# Patient Record
Sex: FEMALE | Race: WHITE | NOT HISPANIC OR LATINO | Employment: FULL TIME | URBAN - METROPOLITAN AREA
[De-identification: names, ages, dates, MRNs, and addresses within clinical notes are randomized per-mention and may not be internally consistent; named-entity substitution may affect disease eponyms.]

---

## 2023-09-13 ENCOUNTER — OFFICE VISIT (OUTPATIENT)
Dept: PRIMARY CARE | Facility: CLINIC | Age: 38
End: 2023-09-13
Payer: COMMERCIAL

## 2023-09-13 VITALS
TEMPERATURE: 97.8 F | RESPIRATION RATE: 16 BRPM | WEIGHT: 168 LBS | HEIGHT: 65 IN | DIASTOLIC BLOOD PRESSURE: 74 MMHG | OXYGEN SATURATION: 98 % | HEART RATE: 76 BPM | BODY MASS INDEX: 27.99 KG/M2 | SYSTOLIC BLOOD PRESSURE: 109 MMHG

## 2023-09-13 DIAGNOSIS — Z13.29 SCREENING FOR THYROID DISORDER: ICD-10-CM

## 2023-09-13 DIAGNOSIS — Z23 NEED FOR TETANUS BOOSTER: ICD-10-CM

## 2023-09-13 DIAGNOSIS — Z13.89 SCREENING FOR NEPHROPATHY: ICD-10-CM

## 2023-09-13 DIAGNOSIS — E55.9 VITAMIN D DEFICIENCY: Primary | ICD-10-CM

## 2023-09-13 DIAGNOSIS — Z13.220 SCREENING FOR HYPERLIPIDEMIA: ICD-10-CM

## 2023-09-13 DIAGNOSIS — R00.2 PALPITATIONS: ICD-10-CM

## 2023-09-13 DIAGNOSIS — R25.2 LEG CRAMPS: ICD-10-CM

## 2023-09-13 DIAGNOSIS — Z00.00 HEALTHCARE MAINTENANCE: ICD-10-CM

## 2023-09-13 DIAGNOSIS — Z13.0 SCREENING FOR DEFICIENCY ANEMIA: ICD-10-CM

## 2023-09-13 PROBLEM — N63.11 BREAST LUMP ON RIGHT SIDE AT 11 O'CLOCK POSITION: Status: ACTIVE | Noted: 2023-09-13

## 2023-09-13 PROBLEM — N60.01 CYST OF RIGHT BREAST: Status: ACTIVE | Noted: 2023-09-13

## 2023-09-13 PROBLEM — R39.15 URGENCY OF URINATION: Status: ACTIVE | Noted: 2023-09-13

## 2023-09-13 PROBLEM — R35.0 URINARY FREQUENCY: Status: ACTIVE | Noted: 2023-09-13

## 2023-09-13 PROBLEM — N94.3 PMS (PREMENSTRUAL SYNDROME): Status: ACTIVE | Noted: 2023-09-13

## 2023-09-13 PROCEDURE — 1036F TOBACCO NON-USER: CPT

## 2023-09-13 PROCEDURE — 90471 IMMUNIZATION ADMIN: CPT

## 2023-09-13 PROCEDURE — 99395 PREV VISIT EST AGE 18-39: CPT

## 2023-09-13 PROCEDURE — 90715 TDAP VACCINE 7 YRS/> IM: CPT

## 2023-09-13 RX ORDER — GINSENG 100 MG
1 CAPSULE ORAL DAILY
COMMUNITY

## 2023-09-13 RX ORDER — MULTIVIT-MIN/IRON/FOLIC ACID/K 45-800-120
1 CAPSULE ORAL DAILY
COMMUNITY

## 2023-09-13 RX ORDER — ACETAMINOPHEN 500 MG
1 TABLET ORAL DAILY
COMMUNITY

## 2023-09-13 RX ORDER — CALCIUM CARB, CITRATE, MALATE 250 MG
1 CAPSULE ORAL EVERY OTHER DAY
COMMUNITY

## 2023-09-13 SDOH — ECONOMIC STABILITY: FOOD INSECURITY: WITHIN THE PAST 12 MONTHS, THE FOOD YOU BOUGHT JUST DIDN'T LAST AND YOU DIDN'T HAVE MONEY TO GET MORE.: NEVER TRUE

## 2023-09-13 SDOH — ECONOMIC STABILITY: FOOD INSECURITY: WITHIN THE PAST 12 MONTHS, YOU WORRIED THAT YOUR FOOD WOULD RUN OUT BEFORE YOU GOT MONEY TO BUY MORE.: NEVER TRUE

## 2023-09-13 ASSESSMENT — LIFESTYLE VARIABLES
SKIP TO QUESTIONS 9-10: 1
AUDIT-C TOTAL SCORE: 1
HOW MANY STANDARD DRINKS CONTAINING ALCOHOL DO YOU HAVE ON A TYPICAL DAY: 1 OR 2
HOW OFTEN DO YOU HAVE SIX OR MORE DRINKS ON ONE OCCASION: NEVER
HOW OFTEN DO YOU HAVE A DRINK CONTAINING ALCOHOL: MONTHLY OR LESS

## 2023-09-13 ASSESSMENT — ENCOUNTER SYMPTOMS
RESPIRATORY NEGATIVE: 1
PSYCHIATRIC NEGATIVE: 1
ENDOCRINE NEGATIVE: 1
NEUROLOGICAL NEGATIVE: 1
CARDIOVASCULAR NEGATIVE: 1
CONSTITUTIONAL NEGATIVE: 1
MUSCULOSKELETAL NEGATIVE: 1
EYES NEGATIVE: 1
HEMATOLOGIC/LYMPHATIC NEGATIVE: 1
GASTROINTESTINAL NEGATIVE: 1

## 2023-09-13 ASSESSMENT — PATIENT HEALTH QUESTIONNAIRE - PHQ9
2. FEELING DOWN, DEPRESSED OR HOPELESS: NOT AT ALL
1. LITTLE INTEREST OR PLEASURE IN DOING THINGS: NOT AT ALL
SUM OF ALL RESPONSES TO PHQ9 QUESTIONS 1 & 2: 0

## 2023-09-13 NOTE — PROGRESS NOTES
Subjective   Patient ID: Marcella Baird is a 38 y.o. female who presents for CPE and visit to establish care.    Diet: Mostly cooking at home, good amount of fruits, veggies, lean proteins. Occasional soda, no excess sugary foods  Exercise: 5-7 days a week, was doing mixture of cardio and strength training, hurt her calf and has been doing less cardio and more pilates and HIIT   Weight: Down 20 lbs since January 2023, gained 80lbs with birth of first son 18 years ago  Water: Drinking about 54oz per day, 2-3 cups coffee per day  Sleep: Good sleep, getting 7-9 hours per night   Social:  and remarried, 2 children 2 stepchildren. Lives in a 3 floor home, no issues with stairs. 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     Review of Systems   Constitutional: Negative.    HENT: Negative.     Eyes: Negative.    Respiratory: Negative.     Cardiovascular: Negative.    Gastrointestinal: Negative.    Endocrine: Negative.    Genitourinary: Negative.    Musculoskeletal: Negative.    Skin: Negative.    Neurological: Negative.    Hematological: Negative.    Psychiatric/Behavioral: Negative.          Current Outpatient Medications   Medication Sig Dispense Refill    cholecalciferol (Vitamin D-3) 50 mcg (2,000 unit) capsule Take 1 capsule (50 mcg) by mouth once daily.      cranberry extract 200 mg capsule Take 1 capsule by mouth once daily.      multivitamin-min-iron-FA-vit K (Bariatric Multivitamins) 45 mg iron- 800 mcg-120 mcg capsule Take 1 capsule by mouth once daily.      potassium citrate 99 mg capsule Take 1 tablet by mouth every other day.       No current facility-administered medications for this visit.     Past Surgical History:   Procedure Laterality Date    OTHER SURGICAL HISTORY  08/17/2020    No history of surgery     Family History   Problem Relation Name Age of Onset    No Known Problems Mother      Other (cardiac disorder) Father    "   Coronary artery disease Father      Heart failure Father      Breast cancer Maternal Grandmother      Other (DM2) Maternal Grandmother      Coronary artery disease Paternal Grandfather        Social History     Tobacco Use    Smoking status: Former     Types: Cigarettes    Smokeless tobacco: Never   Vaping Use    Vaping Use: Every day    Substances: Nicotine, Flavoring   Substance Use Topics    Alcohol use: Not Currently     Comment: occasionally    Drug use: Never        Objective     Visit Vitals  /74 (BP Location: Left arm, Patient Position: Sitting, BP Cuff Size: Adult)   Pulse 76   Temp 36.6 °C (97.8 °F)   Resp 16   Ht 1.645 m (5' 4.75\")   Wt 76.2 kg (168 lb)   SpO2 98%   BMI 28.17 kg/m²   Smoking Status Former   BSA 1.87 m²        Physical Exam  Constitutional:       Appearance: Normal appearance.   HENT:      Head: Normocephalic and atraumatic.   Eyes:      Extraocular Movements: Extraocular movements intact.      Pupils: Pupils are equal, round, and reactive to light.   Cardiovascular:      Rate and Rhythm: Normal rate and regular rhythm.   Pulmonary:      Effort: Pulmonary effort is normal.      Breath sounds: Normal breath sounds.   Abdominal:      General: Abdomen is flat. Bowel sounds are normal.      Palpations: Abdomen is soft.   Musculoskeletal:         General: Normal range of motion.   Skin:     General: Skin is warm and dry.      Capillary Refill: Capillary refill takes less than 2 seconds.   Neurological:      General: No focal deficit present.      Mental Status: She is alert and oriented to person, place, and time.   Psychiatric:         Mood and Affect: Mood normal.         Behavior: Behavior normal.           Assessment/Plan   Problem List Items Addressed This Visit       Palpitations     Check potassium, magnesium for electrolyte disturbances.          Vitamin D deficiency - Primary    Relevant Orders    Vitamin D 25-Hydroxy,Total (for eval of Vitamin D levels)    Healthcare " maintenance     - Healthy diet, good quality and duration of sleep, healthy water intake, regular exercise and healthy weight discussed  Vaccines   Flu: Recommended annually  Tdap: About 8 years ago  - GYN/PAP: Following routinely  -Following with dentistry regularly  -No symptoms of depression/concerns for anxiety  -Feeling safe at home  -Skin cancer prevention           Other Visit Diagnoses       Screening for deficiency anemia        Relevant Orders    CBC    Screening for nephropathy        Relevant Orders    Comprehensive Metabolic Panel    Screening for hyperlipidemia        Relevant Orders    Lipid Panel    Leg cramps        Relevant Orders    Magnesium    Screening for thyroid disorder        Relevant Orders    TSH with reflex to Free T4 if abnormal    Need for tetanus booster        Relevant Orders    Tdap vaccine, age 10 years and older  (BOOSTRIX) (Completed)            All pertinent lab work and results were reviewed with patient.     Follow up with me in 1 year or sooner as needed    Vandana Whittington, APRN-CNS

## 2023-09-13 NOTE — PATIENT INSTRUCTIONS
Thank you for coming to see me today.  If you have any questions or concerns following our visit, please contact the office.  Phone: [phone]    Follow up with me in 1 year or sooner as needed    1)  Get fasting labwork in the next 1-2 weeks.  The lab is down the huang from our office.

## 2023-09-27 ENCOUNTER — LAB (OUTPATIENT)
Dept: LAB | Facility: LAB | Age: 38
End: 2023-09-27
Payer: COMMERCIAL

## 2023-09-27 DIAGNOSIS — Z13.29 SCREENING FOR THYROID DISORDER: ICD-10-CM

## 2023-09-27 DIAGNOSIS — R25.2 LEG CRAMPS: ICD-10-CM

## 2023-09-27 DIAGNOSIS — Z13.220 SCREENING FOR HYPERLIPIDEMIA: ICD-10-CM

## 2023-09-27 DIAGNOSIS — Z13.0 SCREENING FOR DEFICIENCY ANEMIA: ICD-10-CM

## 2023-09-27 DIAGNOSIS — E55.9 VITAMIN D DEFICIENCY: ICD-10-CM

## 2023-09-27 DIAGNOSIS — Z13.89 SCREENING FOR NEPHROPATHY: ICD-10-CM

## 2023-09-27 LAB
ALANINE AMINOTRANSFERASE (SGPT) (U/L) IN SER/PLAS: 7 U/L (ref 7–45)
ALBUMIN (G/DL) IN SER/PLAS: 4.6 G/DL (ref 3.4–5)
ALKALINE PHOSPHATASE (U/L) IN SER/PLAS: 38 U/L (ref 33–110)
ANION GAP IN SER/PLAS: 13 MMOL/L (ref 10–20)
ASPARTATE AMINOTRANSFERASE (SGOT) (U/L) IN SER/PLAS: 14 U/L (ref 9–39)
BILIRUBIN TOTAL (MG/DL) IN SER/PLAS: 0.5 MG/DL (ref 0–1.2)
CALCIDIOL (25 OH VITAMIN D3) (NG/ML) IN SER/PLAS: 41 NG/ML
CALCIUM (MG/DL) IN SER/PLAS: 9.4 MG/DL (ref 8.6–10.3)
CARBON DIOXIDE, TOTAL (MMOL/L) IN SER/PLAS: 25 MMOL/L (ref 21–32)
CHLORIDE (MMOL/L) IN SER/PLAS: 103 MMOL/L (ref 98–107)
CHOLESTEROL (MG/DL) IN SER/PLAS: 220 MG/DL (ref 0–199)
CHOLESTEROL IN HDL (MG/DL) IN SER/PLAS: 66.5 MG/DL
CHOLESTEROL/HDL RATIO: 3.3
CREATININE (MG/DL) IN SER/PLAS: 0.81 MG/DL (ref 0.5–1.05)
ERYTHROCYTE DISTRIBUTION WIDTH (RATIO) BY AUTOMATED COUNT: 12.1 % (ref 11.5–14.5)
ERYTHROCYTE MEAN CORPUSCULAR HEMOGLOBIN CONCENTRATION (G/DL) BY AUTOMATED: 33.3 G/DL (ref 32–36)
ERYTHROCYTE MEAN CORPUSCULAR VOLUME (FL) BY AUTOMATED COUNT: 94 FL (ref 80–100)
ERYTHROCYTES (10*6/UL) IN BLOOD BY AUTOMATED COUNT: 4.32 X10E12/L (ref 4–5.2)
GFR FEMALE: >90 ML/MIN/1.73M2
GLUCOSE (MG/DL) IN SER/PLAS: 104 MG/DL (ref 74–99)
HEMATOCRIT (%) IN BLOOD BY AUTOMATED COUNT: 40.6 % (ref 36–46)
HEMOGLOBIN (G/DL) IN BLOOD: 13.5 G/DL (ref 12–16)
LDL: 126 MG/DL (ref 0–99)
LEUKOCYTES (10*3/UL) IN BLOOD BY AUTOMATED COUNT: 7.9 X10E9/L (ref 4.4–11.3)
MAGNESIUM (MG/DL) IN SER/PLAS: 1.96 MG/DL (ref 1.6–2.4)
PLATELETS (10*3/UL) IN BLOOD AUTOMATED COUNT: 267 X10E9/L (ref 150–450)
POTASSIUM (MMOL/L) IN SER/PLAS: 4.5 MMOL/L (ref 3.5–5.3)
PROTEIN TOTAL: 6.7 G/DL (ref 6.4–8.2)
SODIUM (MMOL/L) IN SER/PLAS: 136 MMOL/L (ref 136–145)
THYROTROPIN (MIU/L) IN SER/PLAS BY DETECTION LIMIT <= 0.05 MIU/L: 2.19 MIU/L (ref 0.44–3.98)
TRIGLYCERIDE (MG/DL) IN SER/PLAS: 136 MG/DL (ref 0–149)
UREA NITROGEN (MG/DL) IN SER/PLAS: 11 MG/DL (ref 6–23)
VLDL: 27 MG/DL (ref 0–40)

## 2023-09-27 PROCEDURE — 36415 COLL VENOUS BLD VENIPUNCTURE: CPT

## 2023-09-27 PROCEDURE — 84443 ASSAY THYROID STIM HORMONE: CPT

## 2023-09-27 PROCEDURE — 82306 VITAMIN D 25 HYDROXY: CPT

## 2023-09-27 PROCEDURE — 83735 ASSAY OF MAGNESIUM: CPT

## 2023-09-27 PROCEDURE — 80061 LIPID PANEL: CPT

## 2023-09-27 PROCEDURE — 80053 COMPREHEN METABOLIC PANEL: CPT

## 2023-09-27 PROCEDURE — 85027 COMPLETE CBC AUTOMATED: CPT

## 2024-09-16 ENCOUNTER — APPOINTMENT (OUTPATIENT)
Dept: PRIMARY CARE | Facility: CLINIC | Age: 39
End: 2024-09-16
Payer: COMMERCIAL

## 2024-09-18 ENCOUNTER — APPOINTMENT (OUTPATIENT)
Dept: OBSTETRICS AND GYNECOLOGY | Facility: CLINIC | Age: 39
End: 2024-09-18
Payer: COMMERCIAL

## 2024-09-18 ENCOUNTER — APPOINTMENT (OUTPATIENT)
Dept: PRIMARY CARE | Facility: CLINIC | Age: 39
End: 2024-09-18
Payer: COMMERCIAL

## 2024-09-18 VITALS
SYSTOLIC BLOOD PRESSURE: 100 MMHG | HEIGHT: 64 IN | WEIGHT: 149.4 LBS | DIASTOLIC BLOOD PRESSURE: 68 MMHG | BODY MASS INDEX: 25.51 KG/M2

## 2024-09-18 VITALS
WEIGHT: 148 LBS | OXYGEN SATURATION: 98 % | SYSTOLIC BLOOD PRESSURE: 97 MMHG | DIASTOLIC BLOOD PRESSURE: 66 MMHG | HEART RATE: 74 BPM | TEMPERATURE: 96.9 F | RESPIRATION RATE: 16 BRPM | BODY MASS INDEX: 25.27 KG/M2 | HEIGHT: 64 IN

## 2024-09-18 DIAGNOSIS — Z13.0 SCREENING FOR DEFICIENCY ANEMIA: Primary | ICD-10-CM

## 2024-09-18 DIAGNOSIS — N39.41 URGE INCONTINENCE: Primary | ICD-10-CM

## 2024-09-18 DIAGNOSIS — Z01.419 WELL WOMAN EXAM: ICD-10-CM

## 2024-09-18 DIAGNOSIS — E78.2 MIXED HYPERLIPIDEMIA: ICD-10-CM

## 2024-09-18 DIAGNOSIS — Z12.31 ENCOUNTER FOR SCREENING MAMMOGRAM FOR MALIGNANT NEOPLASM OF BREAST: ICD-10-CM

## 2024-09-18 DIAGNOSIS — R39.15 URGENCY OF URINATION: ICD-10-CM

## 2024-09-18 DIAGNOSIS — Z13.29 SCREENING FOR THYROID DISORDER: ICD-10-CM

## 2024-09-18 DIAGNOSIS — Z13.1 DIABETES MELLITUS SCREENING: ICD-10-CM

## 2024-09-18 DIAGNOSIS — R73.9 HYPERGLYCEMIA: ICD-10-CM

## 2024-09-18 PROBLEM — R35.0 URINARY FREQUENCY: Status: RESOLVED | Noted: 2023-09-13 | Resolved: 2024-09-18

## 2024-09-18 LAB
POC APPEARANCE, URINE: CLEAR
POC BILIRUBIN, URINE: NEGATIVE
POC BLOOD, URINE: NEGATIVE
POC COLOR, URINE: COLORLESS
POC GLUCOSE, URINE: NEGATIVE MG/DL
POC KETONES, URINE: NEGATIVE MG/DL
POC LEUKOCYTES, URINE: NEGATIVE
POC NITRITE,URINE: NEGATIVE
POC PH, URINE: 6 PH
POC PROTEIN, URINE: NEGATIVE MG/DL
POC SPECIFIC GRAVITY, URINE: <=1.005
POC UROBILINOGEN, URINE: 0.2 EU/DL

## 2024-09-18 PROCEDURE — 3008F BODY MASS INDEX DOCD: CPT | Performed by: STUDENT IN AN ORGANIZED HEALTH CARE EDUCATION/TRAINING PROGRAM

## 2024-09-18 PROCEDURE — 1036F TOBACCO NON-USER: CPT

## 2024-09-18 PROCEDURE — 81002 URINALYSIS NONAUTO W/O SCOPE: CPT | Performed by: STUDENT IN AN ORGANIZED HEALTH CARE EDUCATION/TRAINING PROGRAM

## 2024-09-18 PROCEDURE — 99213 OFFICE O/P EST LOW 20 MIN: CPT | Performed by: STUDENT IN AN ORGANIZED HEALTH CARE EDUCATION/TRAINING PROGRAM

## 2024-09-18 PROCEDURE — 3008F BODY MASS INDEX DOCD: CPT

## 2024-09-18 RX ORDER — MIRABEGRON 25 MG/1
25 TABLET, FILM COATED, EXTENDED RELEASE ORAL DAILY
Qty: 30 TABLET | Refills: 11 | Status: SHIPPED | OUTPATIENT
Start: 2024-09-18

## 2024-09-18 RX ORDER — MULTIVIT-MIN/IRON FUM/FOLIC AC 7.5 MG-4
1 TABLET ORAL DAILY
COMMUNITY

## 2024-09-18 SDOH — ECONOMIC STABILITY: FOOD INSECURITY: WITHIN THE PAST 12 MONTHS, YOU WORRIED THAT YOUR FOOD WOULD RUN OUT BEFORE YOU GOT MONEY TO BUY MORE.: NEVER TRUE

## 2024-09-18 SDOH — ECONOMIC STABILITY: FOOD INSECURITY: WITHIN THE PAST 12 MONTHS, THE FOOD YOU BOUGHT JUST DIDN'T LAST AND YOU DIDN'T HAVE MONEY TO GET MORE.: NEVER TRUE

## 2024-09-18 ASSESSMENT — COLUMBIA-SUICIDE SEVERITY RATING SCALE - C-SSRS
1. IN THE PAST MONTH, HAVE YOU WISHED YOU WERE DEAD OR WISHED YOU COULD GO TO SLEEP AND NOT WAKE UP?: NO
2. HAVE YOU ACTUALLY HAD ANY THOUGHTS OF KILLING YOURSELF?: NO
6. HAVE YOU EVER DONE ANYTHING, STARTED TO DO ANYTHING, OR PREPARED TO DO ANYTHING TO END YOUR LIFE?: NO

## 2024-09-18 ASSESSMENT — LIFESTYLE VARIABLES
HOW OFTEN DO YOU HAVE SIX OR MORE DRINKS ON ONE OCCASION: NEVER
HOW OFTEN DO YOU HAVE A DRINK CONTAINING ALCOHOL: MONTHLY OR LESS
AUDIT-C TOTAL SCORE: 1
SKIP TO QUESTIONS 9-10: 1
HOW MANY STANDARD DRINKS CONTAINING ALCOHOL DO YOU HAVE ON A TYPICAL DAY: 1 OR 2

## 2024-09-18 ASSESSMENT — ENCOUNTER SYMPTOMS
DEPRESSION: 0
OCCASIONAL FEELINGS OF UNSTEADINESS: 0
LOSS OF SENSATION IN FEET: 0

## 2024-09-18 ASSESSMENT — PATIENT HEALTH QUESTIONNAIRE - PHQ9
SUM OF ALL RESPONSES TO PHQ9 QUESTIONS 1 AND 2: 0
2. FEELING DOWN, DEPRESSED OR HOPELESS: NOT AT ALL
2. FEELING DOWN, DEPRESSED OR HOPELESS: NOT AT ALL
1. LITTLE INTEREST OR PLEASURE IN DOING THINGS: NOT AT ALL
SUM OF ALL RESPONSES TO PHQ9 QUESTIONS 1 AND 2: 0
1. LITTLE INTEREST OR PLEASURE IN DOING THINGS: NOT AT ALL

## 2024-09-18 ASSESSMENT — PAIN SCALES - GENERAL: PAINLEVEL: 0-NO PAIN

## 2024-09-18 NOTE — PROGRESS NOTES
Subjective   Patient ID: Marcella Baird is a 39 y.o. year old female patient presenting for   Chief Complaint   Patient presents with    Annual Exam    est patient well woman     No concerns noted    .  Patient presents for well woman exam.  Sexual History: No pain intercourse.   Current Contraception:  has a vasectomy.  Menstrual History: She has periods, every month. 4-5 days. First two days are heavy. She does not miss work for this. Ibuprofen works well.  Pregnancy History: 2 kids by SVDx2. 17 and 20 years of age.  Occupation: She works at [employer]. She mostly works with the [employer].    She does have increased urge. Especially during the day. She will get 1-3 nocturia. She has done Oxybutynin in the past, but did not tolerate side effects. She did do Mybetriq and it worked, but the medication was too expensive at the time    No family hx of: Uterine, ovarian, cervical, pancreatic, or colon cancer.    Maternal Great grandmother: Breast cancer in 70s.      Patient did used to smoke cigarettes at 12. She quit with each pregnancy. She occasionally smokes cigarettes, not consistent use. She uses maybe one pack every 3-6 months.    No unexplained weight loss. No weight gain. No fatigue. No fevers or chills. No night sweats. No vision changes. No difficulty swallowing. No dyspnea, chest pain, or orthopnea. No breast masses or nipple discharge. No nausea/vomiting. No diarrhea or constipation. No blood in stool. No burning urination or hesitancy. No hematuria. No dyspareunia or vaginal dryness. No vaginal bleeding. No numbness or tingling of the extremities. No hair loss, skin growths, or rashes/bruising.        Review of Systems   All other systems reviewed and are negative.      Past Medical History:   Diagnosis Date    Breast lump on right side at 11 o'clock position 09/13/2023    Cyst of right breast 09/13/2023    Other specified health status     No pertinent past medical history    Palpitations  09/13/2023    PMS (premenstrual syndrome) 09/13/2023    Vitamin D deficiency 09/13/2023       Past Surgical History:   Procedure Laterality Date    OTHER SURGICAL HISTORY  08/17/2020    No history of surgery       Social History     Socioeconomic History    Marital status:      Spouse name: Not on file    Number of children: Not on file    Years of education: Not on file    Highest education level: Not on file   Occupational History    Not on file   Tobacco Use    Smoking status: Former     Types: Cigarettes    Smokeless tobacco: Never   Vaping Use    Vaping status: Every Day    Substances: Nicotine, Flavoring   Substance and Sexual Activity    Alcohol use: Not Currently     Comment: occasionally    Drug use: Never    Sexual activity: Yes     Partners: Male   Other Topics Concern    Not on file   Social History Narrative    Not on file     Social Determinants of Health     Financial Resource Strain: Not on file   Food Insecurity: No Food Insecurity (9/18/2024)    Hunger Vital Sign     Worried About Running Out of Food in the Last Year: Never true     Ran Out of Food in the Last Year: Never true   Transportation Needs: Not on file   Physical Activity: Not on file   Stress: Not on file   Social Connections: Not on file   Intimate Partner Violence: Not on file   Housing Stability: Not on file           Objective   Physical Exam  General: A&Ox3  Head: Normocephalic, atraumatic  Heart/Lungs: RRR, No murmurs, gallops, or rubs. Lungs are CTAB.  Breast: Normal in appearance bilaterally. No skin changes. No rashes or bruises. No palpable masses.  Abdomen: Soft, nontender. BS+4. No bruising or masses.  Genitourinary: Labia and vagina normal in appearance. Uterus is small, mobile, anteverted. No adnexal masses palpated.  Lower Extremities: No lower extremity Edema no palpable cords.     Assessment/Plan   Problem List Items Addressed This Visit       Urgency of urination    Overview     Previously did well on Myrbetriq.  U/a obtained.  Will start Myrbetriq. Will re-evaluate if too costly. She is open to referral with Uro/Gyn and bladder botox if this treatment fails.         Relevant Orders    POCT UA (nonautomated) manually resulted (Completed)    Well woman exam    Overview     HbA1c obtained.  Breast exam wnl. Mammogram ordered.  Up to date on other screening tests and labs with her PCP.  Follow up in 4 weeks for response to Myrbetriq.          Other Visit Diagnoses       Urge incontinence    -  Primary    Relevant Medications    mirabegron (Myrbetriq) 25 mg tablet extended release 24 hr 24 hr tablet    Encounter for screening mammogram for malignant neoplasm of breast        Relevant Orders    BI mammo bilateral screening tomosynthesis    Diabetes mellitus screening        Relevant Orders    Hemoglobin A1C              Steve Epps MD 04/20/24 3:41 PM

## 2024-09-18 NOTE — PROGRESS NOTES
Patient left without being seen due to provider running behind schedule.  Fasting lab orders placed.    Vandana BRUCE-CNS

## 2024-09-27 ENCOUNTER — APPOINTMENT (OUTPATIENT)
Dept: RADIOLOGY | Facility: CLINIC | Age: 39
End: 2024-09-27
Payer: COMMERCIAL

## 2024-10-04 ENCOUNTER — HOSPITAL ENCOUNTER (OUTPATIENT)
Dept: RADIOLOGY | Facility: CLINIC | Age: 39
Discharge: HOME | End: 2024-10-04
Payer: COMMERCIAL

## 2024-10-04 ENCOUNTER — LAB (OUTPATIENT)
Dept: LAB | Facility: LAB | Age: 39
End: 2024-10-04
Payer: COMMERCIAL

## 2024-10-04 VITALS — BODY MASS INDEX: 24.41 KG/M2 | HEIGHT: 64 IN | WEIGHT: 143 LBS

## 2024-10-04 DIAGNOSIS — Z13.29 SCREENING FOR THYROID DISORDER: ICD-10-CM

## 2024-10-04 DIAGNOSIS — Z12.31 ENCOUNTER FOR SCREENING MAMMOGRAM FOR MALIGNANT NEOPLASM OF BREAST: ICD-10-CM

## 2024-10-04 DIAGNOSIS — Z13.1 DIABETES MELLITUS SCREENING: ICD-10-CM

## 2024-10-04 DIAGNOSIS — E78.2 MIXED HYPERLIPIDEMIA: ICD-10-CM

## 2024-10-04 LAB
25(OH)D3 SERPL-MCNC: 54 NG/ML (ref 30–100)
ALBUMIN SERPL BCP-MCNC: 4.4 G/DL (ref 3.4–5)
ALP SERPL-CCNC: 38 U/L (ref 33–110)
ALT SERPL W P-5'-P-CCNC: 7 U/L (ref 7–45)
ANION GAP SERPL CALC-SCNC: 11 MMOL/L (ref 10–20)
AST SERPL W P-5'-P-CCNC: 12 U/L (ref 9–39)
BILIRUB SERPL-MCNC: 0.5 MG/DL (ref 0–1.2)
BUN SERPL-MCNC: 14 MG/DL (ref 6–23)
CALCIUM SERPL-MCNC: 9.2 MG/DL (ref 8.6–10.3)
CHLORIDE SERPL-SCNC: 102 MMOL/L (ref 98–107)
CHOLEST SERPL-MCNC: 180 MG/DL (ref 0–199)
CHOLESTEROL/HDL RATIO: 3
CO2 SERPL-SCNC: 26 MMOL/L (ref 21–32)
CREAT SERPL-MCNC: 0.78 MG/DL (ref 0.5–1.05)
EGFRCR SERPLBLD CKD-EPI 2021: >90 ML/MIN/1.73M*2
ERYTHROCYTE [DISTWIDTH] IN BLOOD BY AUTOMATED COUNT: 12.2 % (ref 11.5–14.5)
EST. AVERAGE GLUCOSE BLD GHB EST-MCNC: 103 MG/DL
GLUCOSE SERPL-MCNC: 87 MG/DL (ref 74–99)
HBA1C MFR BLD: 5.2 %
HCT VFR BLD AUTO: 41.1 % (ref 36–46)
HDLC SERPL-MCNC: 60 MG/DL
HGB BLD-MCNC: 13.5 G/DL (ref 12–16)
LDLC SERPL CALC-MCNC: 103 MG/DL
MCH RBC QN AUTO: 31.1 PG (ref 26–34)
MCHC RBC AUTO-ENTMCNC: 32.8 G/DL (ref 32–36)
MCV RBC AUTO: 95 FL (ref 80–100)
NON HDL CHOLESTEROL: 120 MG/DL (ref 0–149)
NRBC BLD-RTO: 0 /100 WBCS (ref 0–0)
PLATELET # BLD AUTO: 257 X10*3/UL (ref 150–450)
POTASSIUM SERPL-SCNC: 3.8 MMOL/L (ref 3.5–5.3)
PROT SERPL-MCNC: 6.6 G/DL (ref 6.4–8.2)
RBC # BLD AUTO: 4.34 X10*6/UL (ref 4–5.2)
SODIUM SERPL-SCNC: 135 MMOL/L (ref 136–145)
TRIGL SERPL-MCNC: 87 MG/DL (ref 0–149)
TSH SERPL-ACNC: 1.96 MIU/L (ref 0.44–3.98)
VLDL: 17 MG/DL (ref 0–40)
WBC # BLD AUTO: 6.5 X10*3/UL (ref 4.4–11.3)

## 2024-10-04 PROCEDURE — 85027 COMPLETE CBC AUTOMATED: CPT

## 2024-10-04 PROCEDURE — 84443 ASSAY THYROID STIM HORMONE: CPT

## 2024-10-04 PROCEDURE — 36415 COLL VENOUS BLD VENIPUNCTURE: CPT

## 2024-10-04 PROCEDURE — 80053 COMPREHEN METABOLIC PANEL: CPT

## 2024-10-04 PROCEDURE — 77063 BREAST TOMOSYNTHESIS BI: CPT | Performed by: RADIOLOGY

## 2024-10-04 PROCEDURE — 80061 LIPID PANEL: CPT

## 2024-10-04 PROCEDURE — 77067 SCR MAMMO BI INCL CAD: CPT | Performed by: RADIOLOGY

## 2024-10-04 PROCEDURE — 77067 SCR MAMMO BI INCL CAD: CPT

## 2024-10-04 PROCEDURE — 82306 VITAMIN D 25 HYDROXY: CPT

## 2024-10-04 PROCEDURE — 83036 HEMOGLOBIN GLYCOSYLATED A1C: CPT

## 2024-12-02 ENCOUNTER — APPOINTMENT (OUTPATIENT)
Dept: PRIMARY CARE | Facility: CLINIC | Age: 39
End: 2024-12-02
Payer: COMMERCIAL

## 2024-12-02 VITALS
WEIGHT: 138 LBS | SYSTOLIC BLOOD PRESSURE: 102 MMHG | HEART RATE: 85 BPM | DIASTOLIC BLOOD PRESSURE: 78 MMHG | BODY MASS INDEX: 23.56 KG/M2 | RESPIRATION RATE: 16 BRPM | TEMPERATURE: 98.4 F | HEIGHT: 64 IN | OXYGEN SATURATION: 96 %

## 2024-12-02 DIAGNOSIS — Z00.00 HEALTHCARE MAINTENANCE: Primary | ICD-10-CM

## 2024-12-02 PROCEDURE — 1036F TOBACCO NON-USER: CPT

## 2024-12-02 PROCEDURE — 3008F BODY MASS INDEX DOCD: CPT

## 2024-12-02 PROCEDURE — 99396 PREV VISIT EST AGE 40-64: CPT

## 2024-12-02 SDOH — ECONOMIC STABILITY: FOOD INSECURITY: WITHIN THE PAST 12 MONTHS, YOU WORRIED THAT YOUR FOOD WOULD RUN OUT BEFORE YOU GOT MONEY TO BUY MORE.: NEVER TRUE

## 2024-12-02 SDOH — ECONOMIC STABILITY: FOOD INSECURITY: WITHIN THE PAST 12 MONTHS, THE FOOD YOU BOUGHT JUST DIDN'T LAST AND YOU DIDN'T HAVE MONEY TO GET MORE.: NEVER TRUE

## 2024-12-02 ASSESSMENT — ENCOUNTER SYMPTOMS
GASTROINTESTINAL NEGATIVE: 1
PSYCHIATRIC NEGATIVE: 1
CONSTITUTIONAL NEGATIVE: 1
ENDOCRINE NEGATIVE: 1
NEUROLOGICAL NEGATIVE: 1
OCCASIONAL FEELINGS OF UNSTEADINESS: 0
MUSCULOSKELETAL NEGATIVE: 1
DEPRESSION: 0
CARDIOVASCULAR NEGATIVE: 1
HEMATOLOGIC/LYMPHATIC NEGATIVE: 1
LOSS OF SENSATION IN FEET: 0
RESPIRATORY NEGATIVE: 1
EYES NEGATIVE: 1

## 2024-12-02 ASSESSMENT — LIFESTYLE VARIABLES
HOW OFTEN DO YOU HAVE SIX OR MORE DRINKS ON ONE OCCASION: NEVER
HOW MANY STANDARD DRINKS CONTAINING ALCOHOL DO YOU HAVE ON A TYPICAL DAY: 1 OR 2
SKIP TO QUESTIONS 9-10: 1
AUDIT-C TOTAL SCORE: 1
HOW OFTEN DO YOU HAVE A DRINK CONTAINING ALCOHOL: MONTHLY OR LESS

## 2024-12-02 ASSESSMENT — COLUMBIA-SUICIDE SEVERITY RATING SCALE - C-SSRS
2. HAVE YOU ACTUALLY HAD ANY THOUGHTS OF KILLING YOURSELF?: NO
6. HAVE YOU EVER DONE ANYTHING, STARTED TO DO ANYTHING, OR PREPARED TO DO ANYTHING TO END YOUR LIFE?: NO
1. IN THE PAST MONTH, HAVE YOU WISHED YOU WERE DEAD OR WISHED YOU COULD GO TO SLEEP AND NOT WAKE UP?: NO

## 2024-12-02 ASSESSMENT — PATIENT HEALTH QUESTIONNAIRE - PHQ9
2. FEELING DOWN, DEPRESSED OR HOPELESS: NOT AT ALL
1. LITTLE INTEREST OR PLEASURE IN DOING THINGS: NOT AT ALL
1. LITTLE INTEREST OR PLEASURE IN DOING THINGS: NOT AT ALL
SUM OF ALL RESPONSES TO PHQ9 QUESTIONS 1 AND 2: 0
2. FEELING DOWN, DEPRESSED OR HOPELESS: NOT AT ALL
SUM OF ALL RESPONSES TO PHQ9 QUESTIONS 1 & 2: 0

## 2024-12-02 ASSESSMENT — PAIN SCALES - GENERAL: PAINLEVEL_OUTOF10: 0-NO PAIN

## 2024-12-02 NOTE — PROGRESS NOTES
Subjective   Patient ID: Marcella Baird is a 39 y.o. female who presents for CPE.    No issues, problems, concerns for visit today.    Diet: Mostly cooking at home, good amount of fruits, veggies, lean proteins.  Started Weight Watchers in March 2024, down 30 lbs. Occasional soda but will have it as mixed drink, no excess sugary foods  Exercise: 5-7 days a week, was doing mixture of cardio and strength training, and has been doing less cardio and more pilates. Exercising for at least 20-45 minutes per day before work.  Weight: Down 45 lbs since January 2023, gained 80lbs with birth of first son 18 years ago  Water: Drinking about 54oz per day, 2 cups coffee per day  Sleep: Good sleep, getting 7-9 hours per night   Social:  and remarried, 2 children 2 stepchildren. Lives in a 3 floor home, no issues with stairs. 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     Review of Systems   Constitutional: Negative.    HENT: Negative.     Eyes: Negative.    Respiratory: Negative.     Cardiovascular: Negative.    Gastrointestinal: Negative.    Endocrine: Negative.    Genitourinary: Negative.    Musculoskeletal: Negative.    Skin: Negative.    Neurological: Negative.    Hematological: Negative.    Psychiatric/Behavioral: Negative.          Current Outpatient Medications   Medication Sig Dispense Refill    cholecalciferol (Vitamin D-3) 50 mcg (2,000 unit) capsule Take 1 capsule (50 mcg) by mouth once daily.      cranberry extract 200 mg capsule Take 1 capsule by mouth once daily.      mirabegron (Myrbetriq) 25 mg tablet extended release 24 hr 24 hr tablet Take 1 tablet (25 mg) by mouth once daily. 30 tablet 11    multivitamin with minerals tablet Take 1 tablet by mouth once daily.      multivitamin-min-iron-FA-vit K (Bariatric Multivitamins) 45 mg iron- 800 mcg-120 mcg capsule Take 1 capsule by mouth once daily.      potassium citrate 99 mg capsule  "Take 1 tablet by mouth every other day.       No current facility-administered medications for this visit.     Past Surgical History:   Procedure Laterality Date    OTHER SURGICAL HISTORY  08/17/2020    No history of surgery     Family History   Problem Relation Name Age of Onset    No Known Problems Mother      Other (cardiac disorder) Father      Coronary artery disease Father      Heart failure Father      Breast cancer Maternal Grandmother          GREAT GRANDMOTHER    Other (DM2) Maternal Grandmother      Coronary artery disease Paternal Grandfather        Social History     Tobacco Use    Smoking status: Former     Types: Cigarettes    Smokeless tobacco: Never   Vaping Use    Vaping status: Every Day    Substances: Nicotine, Flavoring   Substance Use Topics    Alcohol use: Not Currently     Comment: occasionally    Drug use: Never        Objective     Visit Vitals  /78 (BP Location: Left arm, Patient Position: Sitting, BP Cuff Size: Adult)   Pulse 85   Temp 36.9 °C (98.4 °F) (Temporal)   Resp 16   Ht 1.632 m (5' 4.25\")   Wt 62.6 kg (138 lb)   SpO2 96%   BMI 23.50 kg/m²   OB Status Having periods   Smoking Status Former   BSA 1.68 m²        Physical Exam  Constitutional:       Appearance: Normal appearance.   HENT:      Head: Normocephalic and atraumatic.   Eyes:      Extraocular Movements: Extraocular movements intact.      Pupils: Pupils are equal, round, and reactive to light.   Cardiovascular:      Rate and Rhythm: Normal rate and regular rhythm.   Pulmonary:      Effort: Pulmonary effort is normal.      Breath sounds: Normal breath sounds.   Abdominal:      General: Abdomen is flat. Bowel sounds are normal.      Palpations: Abdomen is soft.   Musculoskeletal:         General: Normal range of motion.   Skin:     General: Skin is warm and dry.      Capillary Refill: Capillary refill takes less than 2 seconds.   Neurological:      General: No focal deficit present.      Mental Status: She is alert and " oriented to person, place, and time.   Psychiatric:         Mood and Affect: Mood normal.         Behavior: Behavior normal.           Assessment/Plan   Problem List Items Addressed This Visit       Healthcare maintenance - Primary     - Healthy diet, good quality and duration of sleep, healthy water intake, regular exercise and healthy weight discussed  Vaccines   Flu: Recommended annually  Tdap: Last in 9/2023  - GYN/PAP: Following routinely  - Mammogram: Last in 10/2024, WNL. Per GYN  -Following with dentistry regularly  -No symptoms of depression/concerns for anxiety  -Feeling safe at home  -Skin cancer prevention             All pertinent lab work and results were reviewed with patient.     Follow up with me in 1 year    JANIS Mireles-CNS

## 2024-12-02 NOTE — PATIENT INSTRUCTIONS
Thank you for coming to see me today.  If you have any questions or concerns following our visit, please contact the office.  Phone: [phone]    Follow up with me in 1 year or sooner as needed    1) Can call for labs to be done a few weeks prior to next visit if you'd like

## 2024-12-02 NOTE — ASSESSMENT & PLAN NOTE
- Healthy diet, good quality and duration of sleep, healthy water intake, regular exercise and healthy weight discussed  Vaccines   Flu: Recommended annually  Tdap: Last in 9/2023  - GYN/PAP: Following routinely  - Mammogram: Last in 10/2024, WNL. Per GYN  -Following with dentistry regularly  -No symptoms of depression/concerns for anxiety  -Feeling safe at home  -Skin cancer prevention

## 2025-05-01 ENCOUNTER — TELEPHONE (OUTPATIENT)
Dept: PRIMARY CARE | Facility: CLINIC | Age: 40
End: 2025-05-01

## 2025-05-01 ENCOUNTER — OFFICE VISIT (OUTPATIENT)
Dept: PRIMARY CARE | Facility: CLINIC | Age: 40
End: 2025-05-01
Payer: COMMERCIAL

## 2025-05-01 VITALS
DIASTOLIC BLOOD PRESSURE: 72 MMHG | OXYGEN SATURATION: 98 % | HEART RATE: 64 BPM | TEMPERATURE: 97.6 F | SYSTOLIC BLOOD PRESSURE: 107 MMHG | RESPIRATION RATE: 16 BRPM

## 2025-05-01 DIAGNOSIS — R22.1 LOCALIZED SWELLING, MASS AND LUMP, NECK: Primary | ICD-10-CM

## 2025-05-01 PROCEDURE — 1036F TOBACCO NON-USER: CPT | Performed by: STUDENT IN AN ORGANIZED HEALTH CARE EDUCATION/TRAINING PROGRAM

## 2025-05-01 PROCEDURE — 99213 OFFICE O/P EST LOW 20 MIN: CPT | Performed by: STUDENT IN AN ORGANIZED HEALTH CARE EDUCATION/TRAINING PROGRAM

## 2025-05-01 SDOH — ECONOMIC STABILITY: FOOD INSECURITY: WITHIN THE PAST 12 MONTHS, THE FOOD YOU BOUGHT JUST DIDN'T LAST AND YOU DIDN'T HAVE MONEY TO GET MORE.: NEVER TRUE

## 2025-05-01 SDOH — ECONOMIC STABILITY: FOOD INSECURITY: WITHIN THE PAST 12 MONTHS, YOU WORRIED THAT YOUR FOOD WOULD RUN OUT BEFORE YOU GOT MONEY TO BUY MORE.: NEVER TRUE

## 2025-05-01 ASSESSMENT — LIFESTYLE VARIABLES
AUDIT-C TOTAL SCORE: 2
SKIP TO QUESTIONS 9-10: 1
HOW OFTEN DO YOU HAVE A DRINK CONTAINING ALCOHOL: 2-4 TIMES A MONTH
HOW OFTEN DO YOU HAVE SIX OR MORE DRINKS ON ONE OCCASION: NEVER
HOW MANY STANDARD DRINKS CONTAINING ALCOHOL DO YOU HAVE ON A TYPICAL DAY: 1 OR 2

## 2025-05-01 ASSESSMENT — ENCOUNTER SYMPTOMS
CONSTIPATION: 0
COLOR CHANGE: 0
CHILLS: 0
HEADACHES: 0
FATIGUE: 0
UNEXPECTED WEIGHT CHANGE: 0
VOMITING: 0
CONFUSION: 0
MUSCULOSKELETAL NEGATIVE: 1
PALPITATIONS: 0
NAUSEA: 0
ABDOMINAL PAIN: 0
SHORTNESS OF BREATH: 0
COUGH: 0
WHEEZING: 0
DIZZINESS: 0
DIARRHEA: 0
FEVER: 0

## 2025-05-01 NOTE — PROGRESS NOTES
Subjective   Patient ID: Marcella Baird is a 40 y.o. female who presents for Sick Visit (Pt has a small bump on the left side of her neck.  Pt noticed it a few months ago, then realized it hasn't gone away).    HPI   MM pt here for sick visit.  Reports she has lump on L side of her neck, noticed few months ago and has not gone away. Reports size been the same, denies pain; denies lump in other areas of the body.   Reports she has lost about 80 lbs thru wt watchers and exercises over the year; denies night sweats, appetite has been good. She has lost about 5 lbs since LOV (10/24). Patient recently (10/24) had blood work as CBC/CMP, TSH and other with normal result.     Review of Systems   Constitutional:  Negative for chills, fatigue, fever and unexpected weight change.   HENT: Negative.     Respiratory:  Negative for cough, shortness of breath and wheezing.    Cardiovascular:  Negative for chest pain, palpitations and leg swelling.   Gastrointestinal:  Negative for abdominal pain, constipation, diarrhea, nausea and vomiting.   Musculoskeletal: Negative.    Skin:  Negative for color change and rash.   Neurological:  Negative for dizziness and headaches.   Psychiatric/Behavioral:  Negative for behavioral problems and confusion.        Objective   /72 (BP Location: Right arm, Patient Position: Sitting, BP Cuff Size: Adult)   Pulse 64   Temp 36.4 °C (97.6 °F) (Temporal)   Resp 16   SpO2 98%     Physical Exam  Vitals and nursing note reviewed.   Constitutional:       Appearance: Normal appearance.   Neck:      Comments: Has small, soft appearing lump on the L side of neck; not ttp and no skin changes.   Cardiovascular:      Rate and Rhythm: Normal rate and regular rhythm.      Pulses: Normal pulses.      Heart sounds: Normal heart sounds.   Pulmonary:      Effort: Pulmonary effort is normal.      Breath sounds: Normal breath sounds.   Abdominal:      General: Abdomen is flat. Bowel sounds are normal.       Palpations: Abdomen is soft.   Musculoskeletal:         General: Normal range of motion.   Neurological:      General: No focal deficit present.      Mental Status: She is alert.   Psychiatric:         Mood and Affect: Mood normal.         Behavior: Behavior normal.       Assessment/Plan   MM pt here for sick visit. She has small, soft appearing lump on the L side of neck, likely enlarged LN as reactive vs other. Added US soft tissue neck to evaluate further. Otherwise she is clinically & vitally stable. Rec to cont following with MM as usual.   Problem List Items Addressed This Visit    None  Visit Diagnoses         Codes      Localized swelling, mass and lump, neck    -  Primary R22.1    Relevant Orders    US head neck soft tissue             This note was partially generated using the Dragon Voice recognition system. There may be some incorrect wording, spelling and/or spelling errors or punctuation errors that were not corrected prior to committing the note to the medical record.      Adiel Ramos MD    [facility]

## 2025-05-09 ENCOUNTER — HOSPITAL ENCOUNTER (OUTPATIENT)
Dept: RADIOLOGY | Facility: CLINIC | Age: 40
Discharge: HOME | End: 2025-05-09
Payer: COMMERCIAL

## 2025-05-09 DIAGNOSIS — R22.1 LOCALIZED SWELLING, MASS AND LUMP, NECK: ICD-10-CM

## 2025-05-09 PROCEDURE — 76536 US EXAM OF HEAD AND NECK: CPT

## 2025-09-22 ENCOUNTER — APPOINTMENT (OUTPATIENT)
Dept: OBSTETRICS AND GYNECOLOGY | Facility: CLINIC | Age: 40
End: 2025-09-22
Payer: COMMERCIAL

## 2025-12-05 ENCOUNTER — APPOINTMENT (OUTPATIENT)
Dept: PRIMARY CARE | Facility: CLINIC | Age: 40
End: 2025-12-05
Payer: COMMERCIAL